# Patient Record
Sex: MALE | Race: WHITE | NOT HISPANIC OR LATINO | Employment: FULL TIME | URBAN - METROPOLITAN AREA
[De-identification: names, ages, dates, MRNs, and addresses within clinical notes are randomized per-mention and may not be internally consistent; named-entity substitution may affect disease eponyms.]

---

## 2023-09-19 ENCOUNTER — OFFICE VISIT (OUTPATIENT)
Dept: URGENT CARE | Facility: URGENT CARE | Age: 23
End: 2023-09-19
Payer: COMMERCIAL

## 2023-09-19 VITALS
BODY MASS INDEX: 23.7 KG/M2 | WEIGHT: 175 LBS | OXYGEN SATURATION: 100 % | HEART RATE: 69 BPM | HEIGHT: 72 IN | TEMPERATURE: 97.6 F | DIASTOLIC BLOOD PRESSURE: 73 MMHG | SYSTOLIC BLOOD PRESSURE: 136 MMHG

## 2023-09-19 DIAGNOSIS — Z04.1 ENCOUNTER FOR EXAMINATION FOLLOWING MOTOR VEHICLE ACCIDENT (MVA): ICD-10-CM

## 2023-09-19 DIAGNOSIS — M54.2 CERVICALGIA: Primary | ICD-10-CM

## 2023-09-19 PROCEDURE — 99203 OFFICE O/P NEW LOW 30 MIN: CPT | Performed by: PHYSICIAN ASSISTANT

## 2023-09-19 RX ORDER — CYCLOBENZAPRINE HCL 5 MG
5 TABLET ORAL
Qty: 20 TABLET | Refills: 0 | Status: SHIPPED | OUTPATIENT
Start: 2023-09-19

## 2023-09-19 RX ORDER — NAPROXEN 500 MG/1
500 TABLET ORAL 2 TIMES DAILY WITH MEALS
Qty: 14 TABLET | Refills: 0 | Status: SHIPPED | OUTPATIENT
Start: 2023-09-19 | End: 2023-09-26
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
## 2023-10-22 ENCOUNTER — HEALTH MAINTENANCE LETTER (OUTPATIENT)
Age: 23
End: 2023-10-22

## 2024-09-25 ENCOUNTER — OFFICE VISIT (OUTPATIENT)
Dept: URGENT CARE | Facility: URGENT CARE | Age: 24
End: 2024-09-25
Payer: COMMERCIAL

## 2024-09-25 VITALS
TEMPERATURE: 97.9 F | DIASTOLIC BLOOD PRESSURE: 64 MMHG | RESPIRATION RATE: 20 BRPM | OXYGEN SATURATION: 97 % | SYSTOLIC BLOOD PRESSURE: 112 MMHG | WEIGHT: 190 LBS | HEART RATE: 63 BPM | BODY MASS INDEX: 25.77 KG/M2

## 2024-09-25 DIAGNOSIS — M54.50 ACUTE LEFT-SIDED LOW BACK PAIN WITHOUT SCIATICA: Primary | ICD-10-CM

## 2024-09-25 PROCEDURE — 99214 OFFICE O/P EST MOD 30 MIN: CPT | Performed by: FAMILY MEDICINE

## 2024-09-25 RX ORDER — IBUPROFEN 600 MG/1
TABLET, FILM COATED ORAL
Qty: 30 TABLET | Refills: 0 | Status: SHIPPED | OUTPATIENT
Start: 2024-09-25

## 2024-09-25 NOTE — PROGRESS NOTES
ICD-10-CM    1. Acute left-sided low back pain without sciatica  M54.50 ibuprofen (ADVIL/MOTRIN) 600 MG tablet        Very low suspicion for cauda equina syndrome.  Likely muscle strain along with SI joint irritation.  No trauma to the area, and no red flag signs, so imaging is not indicated at this time.  Will focus on anti-inflammatories for a few days and if not improving chiropractic adjustment may be beneficial.    PLAN:  Patient Instructions   Take ibuprofen 600 mg three times daily for the next 3 days, then three times daily as needed.    If not improving after the weekend, please follow up with a chiropractor.    SUBJECTIVE:  Roxana Puga is a 24 year old male who presents to  today with L sided lower back soreness for 3 days.  No urinary symptoms.  Has tried heat, which does help somewhat.  Has not tried anything else.  No history of prior back pain or other back issues.  Pain is most bothersome when changing from sitting to standing or the reverse.  No bowel/bladder changes.  No radiating pain, numbness, or tingling into either leg.  No trauma or heavy lifting lately.    OBJECTIVE:  /64   Pulse 63   Temp 97.9  F (36.6  C)   Resp 20   Wt 86.2 kg (190 lb)   SpO2 97%   BMI 25.77 kg/m    GEN: well-appearing, in NAD  Back: tender L upper SI joint line, no bony midline tenderness, no tenderness in the lumbar muscles and no palpable spasm noted.  No increased discomfort with MAGALY.  Negative SLR bilaterally.  Normal lumbar AROM.  Neuro: slightly antalgic gait, normal strength throughout both lower extremities

## 2024-09-25 NOTE — PATIENT INSTRUCTIONS
Take ibuprofen 600 mg three times daily for the next 3 days, then three times daily as needed.    If not improving after the weekend, please follow up with a chiropractor.

## 2024-12-15 ENCOUNTER — HEALTH MAINTENANCE LETTER (OUTPATIENT)
Age: 24
End: 2024-12-15